# Patient Record
Sex: FEMALE | Race: WHITE | NOT HISPANIC OR LATINO | Employment: OTHER | URBAN - METROPOLITAN AREA
[De-identification: names, ages, dates, MRNs, and addresses within clinical notes are randomized per-mention and may not be internally consistent; named-entity substitution may affect disease eponyms.]

---

## 2023-02-07 ENCOUNTER — HOSPITAL ENCOUNTER (EMERGENCY)
Facility: CLINIC | Age: 88
Discharge: HOME | End: 2023-02-07
Attending: EMERGENCY MEDICINE | Admitting: EMERGENCY MEDICINE
Payer: MEDICARE

## 2023-02-07 ENCOUNTER — APPOINTMENT (OUTPATIENT)
Dept: CT IMAGING | Facility: CLINIC | Age: 88
End: 2023-02-07
Attending: EMERGENCY MEDICINE
Payer: MEDICARE

## 2023-02-07 VITALS
RESPIRATION RATE: 16 BRPM | OXYGEN SATURATION: 99 % | SYSTOLIC BLOOD PRESSURE: 112 MMHG | HEART RATE: 58 BPM | DIASTOLIC BLOOD PRESSURE: 60 MMHG | TEMPERATURE: 98.2 F

## 2023-02-07 DIAGNOSIS — R55 VASOVAGAL SYNCOPE: ICD-10-CM

## 2023-02-07 DIAGNOSIS — R11.2 NAUSEA AND VOMITING, UNSPECIFIED VOMITING TYPE: ICD-10-CM

## 2023-02-07 DIAGNOSIS — J90 PLEURAL EFFUSION: ICD-10-CM

## 2023-02-07 LAB
ALBUMIN UR-MCNC: NEGATIVE MG/DL
ANION GAP SERPL CALCULATED.3IONS-SCNC: 8 MMOL/L (ref 7–15)
APPEARANCE UR: CLEAR
BASOPHILS # BLD AUTO: 0 10E3/UL (ref 0–0.2)
BASOPHILS NFR BLD AUTO: 0 %
BILIRUB UR QL STRIP: NEGATIVE
BUN SERPL-MCNC: 11.2 MG/DL (ref 8–23)
CALCIUM SERPL-MCNC: 8.7 MG/DL (ref 8.8–10.2)
CHLORIDE SERPL-SCNC: 104 MMOL/L (ref 98–107)
COLOR UR AUTO: YELLOW
CREAT SERPL-MCNC: 0.7 MG/DL (ref 0.51–0.95)
DEPRECATED HCO3 PLAS-SCNC: 29 MMOL/L (ref 22–29)
EOSINOPHIL # BLD AUTO: 0 10E3/UL (ref 0–0.7)
EOSINOPHIL NFR BLD AUTO: 0 %
ERYTHROCYTE [DISTWIDTH] IN BLOOD BY AUTOMATED COUNT: 13.8 % (ref 10–15)
GFR SERPL CREATININE-BSD FRML MDRD: 84 ML/MIN/1.73M2
GLUCOSE SERPL-MCNC: 141 MG/DL (ref 70–99)
GLUCOSE UR STRIP-MCNC: NEGATIVE MG/DL
HCT VFR BLD AUTO: 43.7 % (ref 35–47)
HGB BLD-MCNC: 14.5 G/DL (ref 11.7–15.7)
HGB UR QL STRIP: NEGATIVE
HOLD SPECIMEN: NORMAL
HOLD SPECIMEN: NORMAL
HYALINE CASTS: 3 /LPF
IMM GRANULOCYTES # BLD: 0 10E3/UL
IMM GRANULOCYTES NFR BLD: 0 %
KETONES UR STRIP-MCNC: NEGATIVE MG/DL
LEUKOCYTE ESTERASE UR QL STRIP: NEGATIVE
LYMPHOCYTES # BLD AUTO: 1.2 10E3/UL (ref 0.8–5.3)
LYMPHOCYTES NFR BLD AUTO: 16 %
MCH RBC QN AUTO: 31.9 PG (ref 26.5–33)
MCHC RBC AUTO-ENTMCNC: 33.2 G/DL (ref 31.5–36.5)
MCV RBC AUTO: 96 FL (ref 78–100)
MONOCYTES # BLD AUTO: 0.4 10E3/UL (ref 0–1.3)
MONOCYTES NFR BLD AUTO: 5 %
MUCOUS THREADS #/AREA URNS LPF: PRESENT /LPF
NEUTROPHILS # BLD AUTO: 5.8 10E3/UL (ref 1.6–8.3)
NEUTROPHILS NFR BLD AUTO: 79 %
NITRATE UR QL: NEGATIVE
NRBC # BLD AUTO: 0 10E3/UL
NRBC BLD AUTO-RTO: 0 /100
PH UR STRIP: 5 [PH] (ref 5–7)
PLATELET # BLD AUTO: 246 10E3/UL (ref 150–450)
POTASSIUM SERPL-SCNC: 3.4 MMOL/L (ref 3.4–5.3)
RBC # BLD AUTO: 4.55 10E6/UL (ref 3.8–5.2)
RBC URINE: 1 /HPF
SODIUM SERPL-SCNC: 141 MMOL/L (ref 136–145)
SP GR UR STRIP: 1.02 (ref 1–1.03)
SQUAMOUS EPITHELIAL: 1 /HPF
TROPONIN T SERPL HS-MCNC: 10 NG/L
UROBILINOGEN UR STRIP-MCNC: NORMAL MG/DL
WBC # BLD AUTO: 7.4 10E3/UL (ref 4–11)
WBC URINE: 2 /HPF

## 2023-02-07 PROCEDURE — 96360 HYDRATION IV INFUSION INIT: CPT | Mod: 59

## 2023-02-07 PROCEDURE — 258N000003 HC RX IP 258 OP 636: Performed by: EMERGENCY MEDICINE

## 2023-02-07 PROCEDURE — 36415 COLL VENOUS BLD VENIPUNCTURE: CPT | Performed by: EMERGENCY MEDICINE

## 2023-02-07 PROCEDURE — 80048 BASIC METABOLIC PNL TOTAL CA: CPT | Performed by: EMERGENCY MEDICINE

## 2023-02-07 PROCEDURE — 81001 URINALYSIS AUTO W/SCOPE: CPT | Performed by: EMERGENCY MEDICINE

## 2023-02-07 PROCEDURE — 85025 COMPLETE CBC W/AUTO DIFF WBC: CPT | Performed by: EMERGENCY MEDICINE

## 2023-02-07 PROCEDURE — 250N000009 HC RX 250: Performed by: EMERGENCY MEDICINE

## 2023-02-07 PROCEDURE — 250N000011 HC RX IP 250 OP 636: Performed by: EMERGENCY MEDICINE

## 2023-02-07 PROCEDURE — 93005 ELECTROCARDIOGRAM TRACING: CPT

## 2023-02-07 PROCEDURE — 74177 CT ABD & PELVIS W/CONTRAST: CPT | Mod: MG

## 2023-02-07 PROCEDURE — 99285 EMERGENCY DEPT VISIT HI MDM: CPT | Mod: 25

## 2023-02-07 PROCEDURE — G1010 CDSM STANSON: HCPCS

## 2023-02-07 PROCEDURE — 84484 ASSAY OF TROPONIN QUANT: CPT | Performed by: EMERGENCY MEDICINE

## 2023-02-07 RX ORDER — SODIUM CHLORIDE 9 MG/ML
INJECTION, SOLUTION INTRAVENOUS CONTINUOUS
Status: DISCONTINUED | OUTPATIENT
Start: 2023-02-07 | End: 2023-02-07 | Stop reason: DRUGHIGH

## 2023-02-07 RX ORDER — IOPAMIDOL 755 MG/ML
500 INJECTION, SOLUTION INTRAVASCULAR ONCE
Status: COMPLETED | OUTPATIENT
Start: 2023-02-07 | End: 2023-02-07

## 2023-02-07 RX ORDER — ONDANSETRON 4 MG/1
4 TABLET, ORALLY DISINTEGRATING ORAL EVERY 8 HOURS PRN
Qty: 10 TABLET | Refills: 0 | Status: SHIPPED | OUTPATIENT
Start: 2023-02-07 | End: 2023-02-10

## 2023-02-07 RX ADMIN — SODIUM CHLORIDE 61 ML: 9 INJECTION, SOLUTION INTRAVENOUS at 15:02

## 2023-02-07 RX ADMIN — IOPAMIDOL 83 ML: 755 INJECTION, SOLUTION INTRAVENOUS at 15:01

## 2023-02-07 RX ADMIN — SODIUM CHLORIDE 500 ML: 9 INJECTION, SOLUTION INTRAVENOUS at 14:18

## 2023-02-07 ASSESSMENT — ENCOUNTER SYMPTOMS
VOMITING: 1
ABDOMINAL PAIN: 1
HEADACHES: 0
DIARRHEA: 0

## 2023-02-07 ASSESSMENT — ACTIVITIES OF DAILY LIVING (ADL)
ADLS_ACUITY_SCORE: 35
ADLS_ACUITY_SCORE: 35

## 2023-02-07 NOTE — DISCHARGE INSTRUCTIONS
Please follow-up with your primary care physician by the end of the week for repeat evaluation.    You should seek medical attention right away if you have recurrent vomiting, recurrent dizziness, or any new concerns.

## 2023-02-07 NOTE — ED TRIAGE NOTES
PT arrives via EMS, EMS reports that pt was at [employer] [place] with daughter who reports that pt became diaphoretic and had one episode of emesis there when she had a syncopal event. PT AxOx2 right now which EMS reports pt has some confusion at baseline. PT BEFAST negative at this time. PT VSS and ABC's intact

## 2023-02-07 NOTE — ED PROVIDER NOTES
History     Chief Complaint:  Altered Mental Status and Syncope       HPI   Mikala Barnett is a 87 year old female with a history of granulosis cell carcinoma and atrial fibrillation anticoagulated on Eliquis who presents with her daughter for evaluation of vomiting and syncope. The patient's daughter reports that she was bringing the patient to [place] for a dental extraction. They stopped at [employer] and the patient had a plain pancake with some coffee. Afterwards the patient said she needed to use the bathroom and they stopped at a gas station where she had an episode of vomiting. On the way back to the car she had a syncopal episode where she fell onto her daughter. She came around and then had 2 more episodes of vomiting. She states she had abdominal pain around her umbilicus. She was not taking any new medications from the dentist. No recent sick contacts. No recent travel. The patient reports feeling better now. She denies headache or chest pain. Daughter notes she was possibly switched from Buspar to Depakote.     Independent Historian:   None - Patient Only and Daughter - They report history as above    Review of External Notes: Prior primary care notes reviewed.  The patient is followed for anxiety and has had prior confusional state as well.  She takes metoprolol and furosemide.    ROS:  Review of Systems   Cardiovascular: Negative for chest pain.   Gastrointestinal: Positive for abdominal pain and vomiting. Negative for diarrhea.   Neurological: Positive for syncope. Negative for headaches.   All other systems reviewed and are negative.      Allergies:  No Known Allergies     Medications:    Buspar  Celexa  Eliquis  Lasix  Levothyroxine  Lopressor    Past Medical History:    Granulosis cell carcinoma of ovary  Hypothyroidism  Hyperlipidemia  Osteoporosis  Valvular heart disease  Atrial fibrillation  CHF  Depression   Mood disorder  Essential tremor    Past Surgical History:    Heart valve  replacement  Cholecystectomy   Appendectomy   Hysterectomy     Social History:  She presents to the ED with her daughter  She lives in [place]    Physical Exam     Patient Vitals for the past 24 hrs:   BP Temp Temp src Pulse Resp SpO2   02/07/23 1449 -- -- -- 53 -- 98 %   02/07/23 1439 -- -- -- 51 -- 100 %   02/07/23 1429 128/68 -- -- 51 -- 99 %   02/07/23 1403 -- -- -- 55 -- 99 %   02/07/23 1358 132/63 -- -- 56 -- 98 %   02/07/23 1259 126/62 98.2  F (36.8  C) Temporal 64 18 99 %        Physical Exam  Constitutional:       General: She is not in acute distress.     Appearance: She is not diaphoretic.   HENT:      Head: Atraumatic.      Mouth/Throat:      Pharynx: No oropharyngeal exudate.   Eyes:      General: No scleral icterus.     Pupils: Pupils are equal, round, and reactive to light.   Cardiovascular:      Rate and Rhythm: Normal rate and regular rhythm.      Heart sounds: Normal heart sounds.   Pulmonary:      Effort: No respiratory distress.      Breath sounds: Normal breath sounds.   Abdominal:      Palpations: Abdomen is soft.      Tenderness: There is no abdominal tenderness.   Musculoskeletal:         General: No tenderness.      Right lower leg: No edema.      Left lower leg: No edema.   Skin:     General: Skin is warm.      Capillary Refill: Capillary refill takes less than 2 seconds.      Findings: No rash.   Neurological:      General: No focal deficit present.      Mental Status: She is oriented to person, place, and time.   Psychiatric:         Mood and Affect: Mood normal.         Behavior: Behavior normal.           Emergency Department Course   ECG  ECG taken at 1324, ECG read at 1326  Atrial flutter with 4:1 AV conduction  Abnormal ECG   No prior  Rate 64 bpm. OR interval * ms. QRS duration 76 ms. QT/QTc 418/431 ms. P-R-T axes 99 29 10.       Imaging:  CT Abdomen Pelvis w Contrast   Final Result   IMPRESSION:    1.  No definite visualized explanation for patient's symptoms.   2.   Colon-containing Molina-type ventral hernia to the right of the   midline without definite evidence of complication.   3.  Small left pleural effusion.      DAVID CASPER MD            SYSTEM ID:  [id number]         Report per radiology    Laboratory:  Labs Ordered and Resulted from Time of ED Arrival to Time of ED Departure   BASIC METABOLIC PANEL - Abnormal       Result Value    Sodium 141      Potassium 3.4      Chloride 104      Carbon Dioxide (CO2) 29      Anion Gap 8      Urea Nitrogen 11.2      Creatinine 0.70      Calcium 8.7 (*)     Glucose 141 (*)     GFR Estimate 84     ROUTINE UA WITH MICROSCOPIC REFLEX TO CULTURE - Abnormal    Color Urine Yellow      Appearance Urine Clear      Glucose Urine Negative      Bilirubin Urine Negative      Ketones Urine Negative      Specific Gravity Urine 1.017      Blood Urine Negative      pH Urine 5.0      Protein Albumin Urine Negative      Urobilinogen Urine Normal      Nitrite Urine Negative      Leukocyte Esterase Urine Negative      Mucus Urine Present (*)     RBC Urine 1      WBC Urine 2      Squamous Epithelials Urine 1      Hyaline Casts Urine 3 (*)    TROPONIN T, HIGH SENSITIVITY - Normal    Troponin T, High Sensitivity 10     CBC WITH PLATELETS AND DIFFERENTIAL    WBC Count 7.4      RBC Count 4.55      Hemoglobin 14.5      Hematocrit 43.7      MCV 96      MCH 31.9      MCHC 33.2      RDW 13.8      Platelet Count 246      % Neutrophils 79      % Lymphocytes 16      % Monocytes 5      % Eosinophils 0      % Basophils 0      % Immature Granulocytes 0      NRBCs per 100 WBC 0      Absolute Neutrophils 5.8      Absolute Lymphocytes 1.2      Absolute Monocytes 0.4      Absolute Eosinophils 0.0      Absolute Basophils 0.0      Absolute Immature Granulocytes 0.0      Absolute NRBCs 0.0          Emergency Department Course & Assessments:       Interventions:  Medications   0.9% sodium chloride BOLUS (500 mLs Intravenous New Bag 2/7/23 1418)   iopamidol (ISOVUE-370)  solution 500 mL (83 mLs Intravenous Given 2/7/23 1501)   CT scan flush (61 mLs Intravenous Given 2/7/23 1502)        Independent Interpretation (X-rays, CTs, rhythm strip):  CT images independently reviewed in real-time.  There does not appear to be evidence of bowel obstruction.    Social Determinants of Health affecting care:   Lives in an assisted living facility.  She has multiple family members who are very supportive.  Good access to follow-up care.    Assessments:  1340 I evaluated the patient and obtained history     Disposition:  The patient was discharged to home.     Impression & Plan      Medical Decision Making:  This patient is a pleasant 87-year-old woman who presents with her daughter following a syncopal episode.  The patient reportedly had dental work done in [place] and was coming up for a tooth extraction.  She was somewhat anxious about this.  She went out to eat at a restaurant and then began to have vomiting a short time later.  She had either syncope or near syncope in the midst of the vomiting and this is most likely a vagal episode.  She does take beta-blockers that this may have contributed as well.    The patient feels that she is back to baseline now.  She was having abdominal pain and noted minimal tenderness on my exam.  CT does not show evidence of obstruction or other acute pathology.  We discussed the incidental findings of a small hernia as well as the pleural effusion.  She will follow these up through her primary care clinic.    The patient was able to tolerate a p.o. challenge and feels well and wants to go home.  She will seek medical attention if she worsens again.  She had Zofran sent to her pharmacy.      Diagnosis:    ICD-10-CM    1. Vasovagal syncope  R55       2. Nausea and vomiting, unspecified vomiting type  R11.2       3. Pleural effusion  J90            Discharge Medications:  New Prescriptions    ONDANSETRON (ZOFRAN ODT) 4 MG ODT TAB    Take 1 tablet (4 mg) by  mouth every 8 hours as needed for nausea          Scribe Disclosure:  I, Brandon Ovalle, am serving as a scribe at 1:50 PM on 2/7/2023 to document services personally performed by Malik Lassiter MD based on my observations and the provider's statements to me.   2/7/2023   Malik Lassiter MD McRoberts, Sean Edward, MD  02/07/23 153

## 2023-02-07 NOTE — ED NOTES
Ambulated pt down ED hallway approximately 40 feet. Pt stated they did not feel dizzy or SOB during ambulation. Pt had a steady gait via A1.

## 2023-02-08 LAB
ATRIAL RATE - MUSE: 256 BPM
DIASTOLIC BLOOD PRESSURE - MUSE: NORMAL MMHG
INTERPRETATION ECG - MUSE: NORMAL
P AXIS - MUSE: 99 DEGREES
PR INTERVAL - MUSE: NORMAL MS
QRS DURATION - MUSE: 76 MS
QT - MUSE: 418 MS
QTC - MUSE: 431 MS
R AXIS - MUSE: 29 DEGREES
SYSTOLIC BLOOD PRESSURE - MUSE: NORMAL MMHG
T AXIS - MUSE: 10 DEGREES
VENTRICULAR RATE- MUSE: 64 BPM